# Patient Record
Sex: MALE | Race: WHITE | NOT HISPANIC OR LATINO | URBAN - METROPOLITAN AREA
[De-identification: names, ages, dates, MRNs, and addresses within clinical notes are randomized per-mention and may not be internally consistent; named-entity substitution may affect disease eponyms.]

---

## 2022-05-03 PROBLEM — Z12.11 SCREENING COLON: Status: ACTIVE | Noted: 2022-05-03

## 2022-07-29 ENCOUNTER — OFFICE (OUTPATIENT)
Dept: URBAN - METROPOLITAN AREA CLINIC 30 | Facility: CLINIC | Age: 57
End: 2022-07-29
Payer: COMMERCIAL

## 2022-07-29 VITALS
DIASTOLIC BLOOD PRESSURE: 75 MMHG | SYSTOLIC BLOOD PRESSURE: 121 MMHG | OXYGEN SATURATION: 98 % | DIASTOLIC BLOOD PRESSURE: 74 MMHG | SYSTOLIC BLOOD PRESSURE: 150 MMHG | DIASTOLIC BLOOD PRESSURE: 107 MMHG | DIASTOLIC BLOOD PRESSURE: 92 MMHG | DIASTOLIC BLOOD PRESSURE: 56 MMHG | SYSTOLIC BLOOD PRESSURE: 135 MMHG | OXYGEN SATURATION: 100 % | HEART RATE: 76 BPM | HEART RATE: 73 BPM | TEMPERATURE: 97.7 F | RESPIRATION RATE: 15 BRPM | OXYGEN SATURATION: 99 % | SYSTOLIC BLOOD PRESSURE: 155 MMHG | SYSTOLIC BLOOD PRESSURE: 114 MMHG | DIASTOLIC BLOOD PRESSURE: 82 MMHG | RESPIRATION RATE: 22 BRPM | RESPIRATION RATE: 18 BRPM | SYSTOLIC BLOOD PRESSURE: 137 MMHG | RESPIRATION RATE: 12 BRPM | HEART RATE: 60 BPM | TEMPERATURE: 97.2 F | SYSTOLIC BLOOD PRESSURE: 129 MMHG | RESPIRATION RATE: 16 BRPM | HEART RATE: 59 BPM | HEART RATE: 70 BPM | RESPIRATION RATE: 23 BRPM | DIASTOLIC BLOOD PRESSURE: 91 MMHG

## 2022-07-29 DIAGNOSIS — K63.89 OTHER SPECIFIED DISEASES OF INTESTINE: ICD-10-CM

## 2022-07-29 DIAGNOSIS — D12.4 BENIGN NEOPLASM OF DESCENDING COLON: ICD-10-CM

## 2022-07-29 DIAGNOSIS — Z12.11 ENCOUNTER FOR SCREENING FOR MALIGNANT NEOPLASM OF COLON: ICD-10-CM

## 2022-07-29 DIAGNOSIS — D12.2 BENIGN NEOPLASM OF ASCENDING COLON: ICD-10-CM

## 2022-07-29 DIAGNOSIS — K63.5 POLYP OF COLON: ICD-10-CM

## 2022-07-29 PROBLEM — D12.5 BENIGN NEOPLASM OF SIGMOID COLON: Status: ACTIVE | Noted: 2022-07-29

## 2022-07-29 LAB
GI LOWER HISTOLOGY - SPECM 1: CLINICAL INFORMATION: (no result)
GI LOWER HISTOLOGY - SPECM 1: CPT CODES: (no result)
GI LOWER HISTOLOGY - SPECM 1: DIAGNOSIS: (no result)
GI LOWER HISTOLOGY - SPECM 1: GROSS DESCRIPTION: (no result)
GI LOWER HISTOLOGY - SPECM 1: INCOMING ICD CODE(S): (no result)
GI LOWER HISTOLOGY - SPECM 1: MICROSCOPIC DESCRIPTION: (no result)
GI LOWER HISTOLOGY - SPECM 1: PATHOLOGIST: (no result)
GI LOWER HISTOLOGY - SPECM 1: PDF REPORT: (no result)
GI LOWER HISTOLOGY - SPECM 1: REPORT TITLE: (no result)
GI LOWER HISTOLOGY - SPECM 1: SPECIMEN SOURCE: (no result)
GI LOWER HISTOLOGY - SPECM 1: SYNOPSIS: (no result)
GI LOWER POLYPECTOMY EXCISION - SPECM 1: CLINICAL INFORMATION: (no result)
GI LOWER POLYPECTOMY EXCISION - SPECM 1: CLINICAL OBSERVATIONS: (no result)
GI LOWER POLYPECTOMY EXCISION - SPECM 1: CPT CODES: (no result)
GI LOWER POLYPECTOMY EXCISION - SPECM 1: DIAGNOSIS: (no result)
GI LOWER POLYPECTOMY EXCISION - SPECM 1: GROSS DESCRIPTION: (no result)
GI LOWER POLYPECTOMY EXCISION - SPECM 1: INCOMING ICD CODE(S): (no result)
GI LOWER POLYPECTOMY EXCISION - SPECM 1: MICROSCOPIC DESCRIPTION: (no result)
GI LOWER POLYPECTOMY EXCISION - SPECM 1: PATHOLOGIST: (no result)
GI LOWER POLYPECTOMY EXCISION - SPECM 1: REPORT TITLE: (no result)
GI LOWER POLYPECTOMY EXCISION - SPECM 1: SPECIMEN COMMENT: (no result)
GI LOWER POLYPECTOMY EXCISION - SPECM 1: SPECIMEN SOURCE: (no result)
GI LOWER POLYPECTOMY EXCISION - SPECM 1: SYNOPSIS: (no result)
GI LOWER POLYPECTOMY EXCISION - SPECM 2: CLINICAL INFORMATION: (no result)
GI LOWER POLYPECTOMY EXCISION - SPECM 2: CLINICAL OBSERVATIONS: (no result)
GI LOWER POLYPECTOMY EXCISION - SPECM 2: CPT CODES: (no result)
GI LOWER POLYPECTOMY EXCISION - SPECM 2: DIAGNOSIS: (no result)
GI LOWER POLYPECTOMY EXCISION - SPECM 2: GROSS DESCRIPTION: (no result)
GI LOWER POLYPECTOMY EXCISION - SPECM 2: INCOMING ICD CODE(S): (no result)
GI LOWER POLYPECTOMY EXCISION - SPECM 2: MICROSCOPIC DESCRIPTION: (no result)
GI LOWER POLYPECTOMY EXCISION - SPECM 2: PATHOLOGIST: (no result)
GI LOWER POLYPECTOMY EXCISION - SPECM 2: REPORT TITLE: (no result)
GI LOWER POLYPECTOMY EXCISION - SPECM 2: SPECIMEN COMMENT: (no result)
GI LOWER POLYPECTOMY EXCISION - SPECM 2: SPECIMEN SOURCE: (no result)
GI LOWER POLYPECTOMY EXCISION - SPECM 2: SYNOPSIS: (no result)
GI LOWER POLYPECTOMY EXCISION - SPECM 3: CLINICAL INFORMATION: (no result)
GI LOWER POLYPECTOMY EXCISION - SPECM 3: CLINICAL OBSERVATIONS: (no result)
GI LOWER POLYPECTOMY EXCISION - SPECM 3: CPT CODES: (no result)
GI LOWER POLYPECTOMY EXCISION - SPECM 3: DIAGNOSIS: (no result)
GI LOWER POLYPECTOMY EXCISION - SPECM 3: GROSS DESCRIPTION: (no result)
GI LOWER POLYPECTOMY EXCISION - SPECM 3: INCOMING ICD CODE(S): (no result)
GI LOWER POLYPECTOMY EXCISION - SPECM 3: MICROSCOPIC DESCRIPTION: (no result)
GI LOWER POLYPECTOMY EXCISION - SPECM 3: PATHOLOGIST: (no result)
GI LOWER POLYPECTOMY EXCISION - SPECM 3: REPORT TITLE: (no result)
GI LOWER POLYPECTOMY EXCISION - SPECM 3: SPECIMEN COMMENT: (no result)
GI LOWER POLYPECTOMY EXCISION - SPECM 3: SPECIMEN SOURCE: (no result)
GI LOWER POLYPECTOMY EXCISION - SPECM 3: SYNOPSIS: (no result)
GI LOWER POLYPECTOMY EXCISION - SPECM 4: CLINICAL INFORMATION: (no result)
GI LOWER POLYPECTOMY EXCISION - SPECM 4: CLINICAL OBSERVATIONS: (no result)
GI LOWER POLYPECTOMY EXCISION - SPECM 4: CPT CODES: (no result)
GI LOWER POLYPECTOMY EXCISION - SPECM 4: DIAGNOSIS: (no result)
GI LOWER POLYPECTOMY EXCISION - SPECM 4: GROSS DESCRIPTION: (no result)
GI LOWER POLYPECTOMY EXCISION - SPECM 4: INCOMING ICD CODE(S): (no result)
GI LOWER POLYPECTOMY EXCISION - SPECM 4: MICROSCOPIC DESCRIPTION: (no result)
GI LOWER POLYPECTOMY EXCISION - SPECM 4: PATHOLOGIST: (no result)
GI LOWER POLYPECTOMY EXCISION - SPECM 4: REPORT TITLE: (no result)
GI LOWER POLYPECTOMY EXCISION - SPECM 4: SPECIMEN COMMENT: (no result)
GI LOWER POLYPECTOMY EXCISION - SPECM 4: SPECIMEN SOURCE: (no result)
GI LOWER POLYPECTOMY EXCISION - SPECM 4: SYNOPSIS: (no result)

## 2022-07-29 PROCEDURE — 45380 COLONOSCOPY AND BIOPSY: CPT | Mod: 59 | Performed by: INTERNAL MEDICINE

## 2022-07-29 PROCEDURE — 45385 COLONOSCOPY W/LESION REMOVAL: CPT | Mod: PT | Performed by: INTERNAL MEDICINE

## 2024-05-28 ENCOUNTER — APPOINTMENT (RX ONLY)
Dept: URBAN - METROPOLITAN AREA CLINIC 42 | Facility: CLINIC | Age: 59
Setting detail: DERMATOLOGY
End: 2024-05-28

## 2024-05-28 DIAGNOSIS — L82.1 OTHER SEBORRHEIC KERATOSIS: ICD-10-CM

## 2024-05-28 DIAGNOSIS — D69.2 OTHER NONTHROMBOCYTOPENIC PURPURA: ICD-10-CM

## 2024-05-28 DIAGNOSIS — L57.0 ACTINIC KERATOSIS: ICD-10-CM

## 2024-05-28 DIAGNOSIS — L57.8 OTHER SKIN CHANGES DUE TO CHRONIC EXPOSURE TO NONIONIZING RADIATION: ICD-10-CM

## 2024-05-28 DIAGNOSIS — L81.4 OTHER MELANIN HYPERPIGMENTATION: ICD-10-CM

## 2024-05-28 PROCEDURE — ? LIQUID NITROGEN

## 2024-05-28 PROCEDURE — ? COUNSELING

## 2024-05-28 PROCEDURE — 99203 OFFICE O/P NEW LOW 30 MIN: CPT | Mod: 25

## 2024-05-28 PROCEDURE — ? SUNSCREEN RECOMMENDATIONS

## 2024-05-28 PROCEDURE — 17000 DESTRUCT PREMALG LESION: CPT

## 2024-05-28 PROCEDURE — 17003 DESTRUCT PREMALG LES 2-14: CPT

## 2024-05-28 ASSESSMENT — LOCATION SIMPLE DESCRIPTION DERM
LOCATION SIMPLE: RIGHT FOREARM
LOCATION SIMPLE: LEFT FOREARM
LOCATION SIMPLE: RIGHT EYEBROW
LOCATION SIMPLE: LEFT EAR
LOCATION SIMPLE: RIGHT CHEEK
LOCATION SIMPLE: NOSE
LOCATION SIMPLE: LEFT CHEEK

## 2024-05-28 ASSESSMENT — LOCATION ZONE DERM
LOCATION ZONE: FACE
LOCATION ZONE: ARM
LOCATION ZONE: EAR
LOCATION ZONE: NOSE

## 2024-05-28 ASSESSMENT — LOCATION DETAILED DESCRIPTION DERM
LOCATION DETAILED: LEFT SUPERIOR LATERAL MALAR CHEEK
LOCATION DETAILED: RIGHT DISTAL DORSAL FOREARM
LOCATION DETAILED: LEFT PROXIMAL DORSAL FOREARM
LOCATION DETAILED: RIGHT CENTRAL EYEBROW
LOCATION DETAILED: LEFT SUPERIOR CENTRAL MALAR CHEEK
LOCATION DETAILED: RIGHT CENTRAL MALAR CHEEK
LOCATION DETAILED: LEFT SUPERIOR HELIX
LOCATION DETAILED: RIGHT PROXIMAL DORSAL FOREARM
LOCATION DETAILED: NASAL DORSUM

## 2024-05-28 NOTE — PROCEDURE: LIQUID NITROGEN
Duration Of Freeze Thaw-Cycle (Seconds): 2
Render Post-Care Instructions In Note?: no
Post-Care Instructions: I reviewed with the patient in detail post-care instructions. Patient is to wear sunprotection, and avoid picking at any of the treated lesions. Pt may apply Vaseline to crusted or scabbing areas.
Show Applicator Variable?: Yes
Number Of Freeze-Thaw Cycles: 2 freeze-thaw cycles
Detail Level: Detailed
Consent: The patient's consent was obtained including but not limited to risks of crusting, scabbing, blistering, scarring, darker or lighter pigmentary change, recurrence, incomplete removal and infection.

## 2024-06-14 ENCOUNTER — APPOINTMENT (RX ONLY)
Dept: URBAN - METROPOLITAN AREA CLINIC 42 | Facility: CLINIC | Age: 59
Setting detail: DERMATOLOGY
End: 2024-06-14

## 2024-06-14 DIAGNOSIS — L82.1 OTHER SEBORRHEIC KERATOSIS: ICD-10-CM

## 2024-06-14 DIAGNOSIS — Z85.828 PERSONAL HISTORY OF OTHER MALIGNANT NEOPLASM OF SKIN: ICD-10-CM

## 2024-06-14 DIAGNOSIS — D22 MELANOCYTIC NEVI: ICD-10-CM | Status: STABLE

## 2024-06-14 DIAGNOSIS — L81.4 OTHER MELANIN HYPERPIGMENTATION: ICD-10-CM | Status: STABLE

## 2024-06-14 DIAGNOSIS — Z71.89 OTHER SPECIFIED COUNSELING: ICD-10-CM

## 2024-06-14 DIAGNOSIS — L57.8 OTHER SKIN CHANGES DUE TO CHRONIC EXPOSURE TO NONIONIZING RADIATION: ICD-10-CM

## 2024-06-14 DIAGNOSIS — D18.0 HEMANGIOMA: ICD-10-CM

## 2024-06-14 PROBLEM — D18.01 HEMANGIOMA OF SKIN AND SUBCUTANEOUS TISSUE: Status: ACTIVE | Noted: 2024-06-14

## 2024-06-14 PROBLEM — D22.5 MELANOCYTIC NEVI OF TRUNK: Status: ACTIVE | Noted: 2024-06-14

## 2024-06-14 PROCEDURE — 99213 OFFICE O/P EST LOW 20 MIN: CPT

## 2024-06-14 PROCEDURE — ? COUNSELING

## 2024-06-14 PROCEDURE — ? SUNSCREEN RECOMMENDATIONS

## 2024-06-14 ASSESSMENT — LOCATION DETAILED DESCRIPTION DERM
LOCATION DETAILED: SUPERIOR LUMBAR SPINE
LOCATION DETAILED: RIGHT INFERIOR CENTRAL MALAR CHEEK
LOCATION DETAILED: RIGHT MID-UPPER BACK
LOCATION DETAILED: EPIGASTRIC SKIN
LOCATION DETAILED: LEFT INFERIOR UPPER BACK
LOCATION DETAILED: SUPERIOR MID FOREHEAD
LOCATION DETAILED: PERIUMBILICAL SKIN
LOCATION DETAILED: LEFT INFERIOR CENTRAL MALAR CHEEK

## 2024-06-14 ASSESSMENT — LOCATION ZONE DERM
LOCATION ZONE: FACE
LOCATION ZONE: TRUNK

## 2024-06-14 ASSESSMENT — LOCATION SIMPLE DESCRIPTION DERM
LOCATION SIMPLE: LEFT CHEEK
LOCATION SIMPLE: LEFT UPPER BACK
LOCATION SIMPLE: LOWER BACK
LOCATION SIMPLE: SUPERIOR FOREHEAD
LOCATION SIMPLE: RIGHT UPPER BACK
LOCATION SIMPLE: ABDOMEN
LOCATION SIMPLE: RIGHT CHEEK